# Patient Record
Sex: FEMALE | Race: WHITE | NOT HISPANIC OR LATINO | URBAN - METROPOLITAN AREA
[De-identification: names, ages, dates, MRNs, and addresses within clinical notes are randomized per-mention and may not be internally consistent; named-entity substitution may affect disease eponyms.]

---

## 2021-04-19 ENCOUNTER — WEB ENCOUNTER (OUTPATIENT)
Dept: URBAN - METROPOLITAN AREA CLINIC 70 | Facility: CLINIC | Age: 26
End: 2021-04-19

## 2021-04-19 ENCOUNTER — OFFICE VISIT (OUTPATIENT)
Dept: URBAN - METROPOLITAN AREA CLINIC 70 | Facility: CLINIC | Age: 26
End: 2021-04-19
Payer: COMMERCIAL

## 2021-04-19 ENCOUNTER — OFFICE VISIT (OUTPATIENT)
Dept: URBAN - METROPOLITAN AREA CLINIC 70 | Facility: CLINIC | Age: 26
End: 2021-04-19

## 2021-04-19 ENCOUNTER — LAB OUTSIDE AN ENCOUNTER (OUTPATIENT)
Dept: URBAN - METROPOLITAN AREA CLINIC 70 | Facility: CLINIC | Age: 26
End: 2021-04-19

## 2021-04-19 VITALS
DIASTOLIC BLOOD PRESSURE: 68 MMHG | HEART RATE: 80 BPM | SYSTOLIC BLOOD PRESSURE: 125 MMHG | BODY MASS INDEX: 30.35 KG/M2 | WEIGHT: 182.2 LBS | HEIGHT: 65 IN | TEMPERATURE: 98.1 F

## 2021-04-19 DIAGNOSIS — R11.14 BILIOUS VOMITING WITHOUT NAUSEA: ICD-10-CM

## 2021-04-19 DIAGNOSIS — K92.1 HEMATOCHEZIA: ICD-10-CM

## 2021-04-19 DIAGNOSIS — K21.9 GERD WITHOUT ESOPHAGITIS: ICD-10-CM

## 2021-04-19 DIAGNOSIS — R19.7 DIARRHEA, UNSPECIFIED TYPE: ICD-10-CM

## 2021-04-19 PROBLEM — 266435005: Status: ACTIVE | Noted: 2021-04-19

## 2021-04-19 PROCEDURE — 99204 OFFICE O/P NEW MOD 45 MIN: CPT | Performed by: NURSE PRACTITIONER

## 2021-04-19 RX ORDER — DULOXETINE 40 MG/1
CAPSULE, DELAYED RELEASE ORAL
Qty: 30 UNSPECIFIED | Status: ACTIVE | COMMUNITY

## 2021-04-19 RX ORDER — AZITHROMYCIN 250 MG/1
TABLET, FILM COATED ORAL
Qty: 6 UNSPECIFIED | Status: ACTIVE | COMMUNITY

## 2021-04-19 RX ORDER — TOPIRAMATE 25 MG/1
TABLET, FILM COATED ORAL
Qty: 30 UNSPECIFIED | Status: ACTIVE | COMMUNITY

## 2021-04-19 RX ORDER — LAMOTRIGINE 100 MG/1
TABLET ORAL
Qty: 0 | Refills: 0 | Status: DISCONTINUED | COMMUNITY
Start: 1900-01-01

## 2021-04-19 RX ORDER — TRAZODONE HYDROCHLORIDE 50 MG/1
TABLET ORAL
Qty: 45 UNSPECIFIED | Status: ACTIVE | COMMUNITY

## 2021-04-19 RX ORDER — HYDROXYZINE PAMOATE 50 MG/1
CAPSULE ORAL
Qty: 180 UNSPECIFIED | Status: ACTIVE | COMMUNITY

## 2021-04-19 RX ORDER — DULOXETINE HYDROCHLORIDE 20 MG/1
1 CAPSULE CAPSULE, DELAYED RELEASE ORAL TWICE A DAY
Status: ACTIVE | COMMUNITY

## 2021-04-19 RX ORDER — METHYLPREDNISOLONE 4 MG/1
TABLET ORAL
Qty: 21 UNSPECIFIED | Status: ACTIVE | COMMUNITY

## 2021-04-19 RX ORDER — PANTOPRAZOLE SODIUM 40 MG/1
1 TABLET TABLET, DELAYED RELEASE ORAL TWICE DAILY
Qty: 180 | Refills: 3 | OUTPATIENT
Start: 2021-04-19

## 2021-04-19 RX ORDER — ZIPRASIDONE HYDROCHLORIDE 20 MG/1
CAPSULE ORAL
Qty: 30 UNSPECIFIED | Status: ACTIVE | COMMUNITY

## 2021-04-19 NOTE — HPI-TODAY'S VISIT:
Pt is a 25 yr old female whom presents for blood in stool, diarrhea, and vomiting. States she has observed BRB in toilet about 3x week for past 2 months. Reports generalized abdominal cramping which is intermittent. Has watery diarrhea 4x week and more formed stool 2x week. Also reports spontaneously having to vomit yellow liquid every AM.  Does not feel heartburn. She is not on an acid reducer. She has hx of EGD and flexible sigmoidoscopy 6/2/16. EGD was normal with neg HP. Flexible sigmoidoscopy found non-bleeding int hemorrhoids which were banded. Denies abnormal wt loss or family hx of colon cancer.

## 2023-01-17 ENCOUNTER — WEB ENCOUNTER (OUTPATIENT)
Dept: URBAN - METROPOLITAN AREA CLINIC 88 | Facility: CLINIC | Age: 28
End: 2023-01-17

## 2023-01-17 ENCOUNTER — OFFICE VISIT (OUTPATIENT)
Dept: URBAN - METROPOLITAN AREA CLINIC 88 | Facility: CLINIC | Age: 28
End: 2023-01-17

## 2023-01-17 ENCOUNTER — OFFICE VISIT (OUTPATIENT)
Dept: URBAN - METROPOLITAN AREA CLINIC 88 | Facility: CLINIC | Age: 28
End: 2023-01-17
Payer: COMMERCIAL

## 2023-01-17 ENCOUNTER — LAB OUTSIDE AN ENCOUNTER (OUTPATIENT)
Dept: URBAN - METROPOLITAN AREA CLINIC 88 | Facility: CLINIC | Age: 28
End: 2023-01-17

## 2023-01-17 VITALS
TEMPERATURE: 97.2 F | BODY MASS INDEX: 34.85 KG/M2 | WEIGHT: 209.2 LBS | DIASTOLIC BLOOD PRESSURE: 69 MMHG | HEART RATE: 77 BPM | HEIGHT: 65 IN | SYSTOLIC BLOOD PRESSURE: 116 MMHG

## 2023-01-17 DIAGNOSIS — K58.1 IRRITABLE BOWEL SYNDROME WITH CONSTIPATION: ICD-10-CM

## 2023-01-17 DIAGNOSIS — K62.5 RECTAL BLEEDING: ICD-10-CM

## 2023-01-17 DIAGNOSIS — K62.89 RECTAL PAIN: ICD-10-CM

## 2023-01-17 DIAGNOSIS — Z83.79 FAMILY HISTORY OF INFLAMMATORY BOWEL DISEASE: ICD-10-CM

## 2023-01-17 PROBLEM — 440630006: Status: ACTIVE | Noted: 2023-01-17

## 2023-01-17 PROCEDURE — 99204 OFFICE O/P NEW MOD 45 MIN: CPT | Performed by: NURSE PRACTITIONER

## 2023-01-17 RX ORDER — PANTOPRAZOLE SODIUM 40 MG/1
1 TABLET TABLET, DELAYED RELEASE ORAL TWICE DAILY
Qty: 180 | Refills: 3 | Status: DISCONTINUED | COMMUNITY
Start: 2021-04-19

## 2023-01-17 RX ORDER — DULOXETINE HYDROCHLORIDE 20 MG/1
1 CAPSULE CAPSULE, DELAYED RELEASE ORAL TWICE A DAY
Status: ACTIVE | COMMUNITY

## 2023-01-17 RX ORDER — METHYLDOPA/HYDROCHLOROTHIAZIDE 250MG-15MG
AS DIRECTED TABLET ORAL
Status: ACTIVE | COMMUNITY

## 2023-01-17 NOTE — HPI-TODAY'S VISIT:
Presents today for evaluation of rectal bleeding.  BRRB with mucus has been present daily for about 2 months.  Bleeding occurs with and without defecation but is worse after defecation.  Defecation typically occurs up to 3/xday.  Stool consistency varies from loose to firm with occasional straining voiced.  Lower abdominal cramping pain occurs and may improve with defecation.  Fails to achieve a complete sense of evacuation with defecation.  Rectal pain is also present and increases with defecation.  Describes rectal pain as labor pains.  Also experiences sensation of protrusion of hemorrhoid from rectum.  Has been applying OTC hemorrhoid cream with little releif.  Has tried and failed OTC laxatives, stool softeners, Miralax, daily fiber regimen and probiotics.   Labs 01/05/2023:  Hgb 14.85, MCV 86.8, , TSH 1.65, normal LFTs, Negative HCV Ab. Last Flex sig in 2016 for rectal bleeding:  medium sized IH.  Hemorrhoid banding performed after this procedures.

## 2023-02-10 ENCOUNTER — OFFICE VISIT (OUTPATIENT)
Dept: URBAN - METROPOLITAN AREA SURGERY CENTER 24 | Facility: SURGERY CENTER | Age: 28
End: 2023-02-10

## 2023-03-24 ENCOUNTER — OFFICE VISIT (OUTPATIENT)
Dept: URBAN - METROPOLITAN AREA SURGERY CENTER 24 | Facility: SURGERY CENTER | Age: 28
End: 2023-03-24

## 2023-09-19 ENCOUNTER — TELEPHONE ENCOUNTER (OUTPATIENT)
Dept: URBAN - METROPOLITAN AREA CLINIC 88 | Facility: CLINIC | Age: 28
End: 2023-09-19

## 2023-10-20 ENCOUNTER — OFFICE VISIT (OUTPATIENT)
Dept: URBAN - METROPOLITAN AREA SURGERY CENTER 24 | Facility: SURGERY CENTER | Age: 28
End: 2023-10-20

## 2023-10-27 ENCOUNTER — OFFICE VISIT (OUTPATIENT)
Dept: URBAN - METROPOLITAN AREA SURGERY CENTER 24 | Facility: SURGERY CENTER | Age: 28
End: 2023-10-27
Payer: COMMERCIAL

## 2023-10-27 ENCOUNTER — CLAIMS CREATED FROM THE CLAIM WINDOW (OUTPATIENT)
Dept: URBAN - METROPOLITAN AREA CLINIC 4 | Facility: CLINIC | Age: 28
End: 2023-10-27
Payer: COMMERCIAL

## 2023-10-27 DIAGNOSIS — K51.40 INFLAMMATORY FIBROID POLYP OF LARGE INTESTINE: ICD-10-CM

## 2023-10-27 DIAGNOSIS — K62.5 ANAL BLEEDING: ICD-10-CM

## 2023-10-27 DIAGNOSIS — K62.5 RECTAL BLEEDING: ICD-10-CM

## 2023-10-27 DIAGNOSIS — K51.40 INFLAMMATORY POLYPS OF COLON WITHOUT COMPLICATIONS: ICD-10-CM

## 2023-10-27 DIAGNOSIS — D12.2 ADENOMATOUS POLYP OF ASCENDING COLON: ICD-10-CM

## 2023-10-27 PROCEDURE — 00811 ANES LWR INTST NDSC NOS: CPT | Performed by: NURSE ANESTHETIST, CERTIFIED REGISTERED

## 2023-10-27 PROCEDURE — G8907 PT DOC NO EVENTS ON DISCHARG: HCPCS | Performed by: INTERNAL MEDICINE

## 2023-10-27 PROCEDURE — 45385 COLONOSCOPY W/LESION REMOVAL: CPT | Performed by: INTERNAL MEDICINE

## 2023-10-27 PROCEDURE — 45380 COLONOSCOPY AND BIOPSY: CPT | Performed by: INTERNAL MEDICINE

## 2023-10-27 PROCEDURE — 88305 TISSUE EXAM BY PATHOLOGIST: CPT | Performed by: PATHOLOGY

## 2023-10-27 RX ORDER — DULOXETINE HYDROCHLORIDE 20 MG/1
1 CAPSULE CAPSULE, DELAYED RELEASE ORAL TWICE A DAY
Status: ACTIVE | COMMUNITY

## 2023-10-27 RX ORDER — METHYLDOPA/HYDROCHLOROTHIAZIDE 250MG-15MG
AS DIRECTED TABLET ORAL
Status: ACTIVE | COMMUNITY

## 2023-10-30 ENCOUNTER — OFFICE VISIT (OUTPATIENT)
Dept: URBAN - METROPOLITAN AREA SURGERY CENTER 24 | Facility: SURGERY CENTER | Age: 28
End: 2023-10-30

## 2023-11-27 ENCOUNTER — DASHBOARD ENCOUNTERS (OUTPATIENT)
Age: 28
End: 2023-11-27

## 2023-12-01 ENCOUNTER — OFFICE VISIT (OUTPATIENT)
Dept: URBAN - METROPOLITAN AREA CLINIC 88 | Facility: CLINIC | Age: 28
End: 2023-12-01

## 2023-12-01 RX ORDER — DULOXETINE HYDROCHLORIDE 20 MG/1
1 CAPSULE CAPSULE, DELAYED RELEASE ORAL TWICE A DAY
Status: ACTIVE | COMMUNITY

## 2023-12-01 RX ORDER — METHYLDOPA/HYDROCHLOROTHIAZIDE 250MG-15MG
AS DIRECTED TABLET ORAL
Status: ACTIVE | COMMUNITY